# Patient Record
Sex: FEMALE | Race: WHITE | HISPANIC OR LATINO | Employment: FULL TIME | URBAN - METROPOLITAN AREA
[De-identification: names, ages, dates, MRNs, and addresses within clinical notes are randomized per-mention and may not be internally consistent; named-entity substitution may affect disease eponyms.]

---

## 2024-05-10 ENCOUNTER — HOSPITAL ENCOUNTER (OUTPATIENT)
Dept: LAB | Facility: MEDICAL CENTER | Age: 54
End: 2024-05-10
Attending: NURSE PRACTITIONER
Payer: COMMERCIAL

## 2024-05-10 DIAGNOSIS — E55.9 VITAMIN D DEFICIENCY: ICD-10-CM

## 2024-05-10 DIAGNOSIS — R74.8 ELEVATED ALKALINE PHOSPHATASE LEVEL: ICD-10-CM

## 2024-05-10 DIAGNOSIS — Z11.4 SCREENING FOR HIV (HUMAN IMMUNODEFICIENCY VIRUS): ICD-10-CM

## 2024-05-10 DIAGNOSIS — E78.2 MIXED HYPERLIPIDEMIA: ICD-10-CM

## 2024-05-10 DIAGNOSIS — R73.03 PREDIABETES: ICD-10-CM

## 2024-05-10 LAB
25(OH)D3 SERPL-MCNC: 37 NG/ML (ref 30–100)
ALBUMIN SERPL BCP-MCNC: 4.5 G/DL (ref 3.2–4.9)
ALBUMIN/GLOB SERPL: 1.6 G/DL
ALP SERPL-CCNC: 114 U/L (ref 30–99)
ALT SERPL-CCNC: 18 U/L (ref 2–50)
ANION GAP SERPL CALC-SCNC: 11 MMOL/L (ref 7–16)
AST SERPL-CCNC: 22 U/L (ref 12–45)
BASOPHILS # BLD AUTO: 0.8 % (ref 0–1.8)
BASOPHILS # BLD: 0.05 K/UL (ref 0–0.12)
BILIRUB SERPL-MCNC: 0.4 MG/DL (ref 0.1–1.5)
BUN SERPL-MCNC: 15 MG/DL (ref 8–22)
CALCIUM ALBUM COR SERPL-MCNC: 9.1 MG/DL (ref 8.5–10.5)
CALCIUM SERPL-MCNC: 9.5 MG/DL (ref 8.5–10.5)
CHLORIDE SERPL-SCNC: 107 MMOL/L (ref 96–112)
CHOLEST SERPL-MCNC: 162 MG/DL (ref 100–199)
CO2 SERPL-SCNC: 23 MMOL/L (ref 20–33)
CREAT SERPL-MCNC: 0.69 MG/DL (ref 0.5–1.4)
EOSINOPHIL # BLD AUTO: 0.18 K/UL (ref 0–0.51)
EOSINOPHIL NFR BLD: 2.7 % (ref 0–6.9)
ERYTHROCYTE [DISTWIDTH] IN BLOOD BY AUTOMATED COUNT: 51.3 FL (ref 35.9–50)
EST. AVERAGE GLUCOSE BLD GHB EST-MCNC: 126 MG/DL
FASTING STATUS PATIENT QL REPORTED: NORMAL
GFR SERPLBLD CREATININE-BSD FMLA CKD-EPI: 104 ML/MIN/1.73 M 2
GLOBULIN SER CALC-MCNC: 2.8 G/DL (ref 1.9–3.5)
GLUCOSE SERPL-MCNC: 94 MG/DL (ref 65–99)
HBA1C MFR BLD: 6 % (ref 4–5.6)
HCT VFR BLD AUTO: 42.6 % (ref 37–47)
HDLC SERPL-MCNC: 43 MG/DL
HGB BLD-MCNC: 14.1 G/DL (ref 12–16)
HIV 1+2 AB+HIV1 P24 AG SERPL QL IA: NORMAL
IMM GRANULOCYTES # BLD AUTO: 0.01 K/UL (ref 0–0.11)
IMM GRANULOCYTES NFR BLD AUTO: 0.2 % (ref 0–0.9)
LDLC SERPL CALC-MCNC: 101 MG/DL
LYMPHOCYTES # BLD AUTO: 2.29 K/UL (ref 1–4.8)
LYMPHOCYTES NFR BLD: 34.8 % (ref 22–41)
MCH RBC QN AUTO: 29.1 PG (ref 27–33)
MCHC RBC AUTO-ENTMCNC: 33.1 G/DL (ref 32.2–35.5)
MCV RBC AUTO: 88 FL (ref 81.4–97.8)
MONOCYTES # BLD AUTO: 0.46 K/UL (ref 0–0.85)
MONOCYTES NFR BLD AUTO: 7 % (ref 0–13.4)
NEUTROPHILS # BLD AUTO: 3.59 K/UL (ref 1.82–7.42)
NEUTROPHILS NFR BLD: 54.5 % (ref 44–72)
NRBC # BLD AUTO: 0 K/UL
NRBC BLD-RTO: 0 /100 WBC (ref 0–0.2)
PLATELET # BLD AUTO: 281 K/UL (ref 164–446)
PMV BLD AUTO: 11 FL (ref 9–12.9)
POTASSIUM SERPL-SCNC: 3.9 MMOL/L (ref 3.6–5.5)
PROT SERPL-MCNC: 7.3 G/DL (ref 6–8.2)
RBC # BLD AUTO: 4.84 M/UL (ref 4.2–5.4)
SODIUM SERPL-SCNC: 141 MMOL/L (ref 135–145)
TRIGL SERPL-MCNC: 91 MG/DL (ref 0–149)
WBC # BLD AUTO: 6.6 K/UL (ref 4.8–10.8)

## 2024-05-16 ENCOUNTER — OFFICE VISIT (OUTPATIENT)
Dept: MEDICAL GROUP | Facility: PHYSICIAN GROUP | Age: 54
End: 2024-05-16
Payer: COMMERCIAL

## 2024-05-16 ENCOUNTER — HOSPITAL ENCOUNTER (OUTPATIENT)
Facility: MEDICAL CENTER | Age: 54
End: 2024-05-16
Attending: NURSE PRACTITIONER
Payer: COMMERCIAL

## 2024-05-16 VITALS
DIASTOLIC BLOOD PRESSURE: 64 MMHG | BODY MASS INDEX: 30.49 KG/M2 | HEIGHT: 65 IN | HEART RATE: 71 BPM | WEIGHT: 183 LBS | TEMPERATURE: 96.8 F | SYSTOLIC BLOOD PRESSURE: 112 MMHG | OXYGEN SATURATION: 97 %

## 2024-05-16 DIAGNOSIS — N89.8 VAGINAL ITCHING: ICD-10-CM

## 2024-05-16 DIAGNOSIS — Z23 NEED FOR VACCINATION: ICD-10-CM

## 2024-05-16 DIAGNOSIS — E55.9 VITAMIN D DEFICIENCY: ICD-10-CM

## 2024-05-16 DIAGNOSIS — R74.8 ELEVATED ALKALINE PHOSPHATASE LEVEL: ICD-10-CM

## 2024-05-16 DIAGNOSIS — E66.9 OBESITY (BMI 30-39.9): ICD-10-CM

## 2024-05-16 DIAGNOSIS — R30.0 DYSURIA: ICD-10-CM

## 2024-05-16 DIAGNOSIS — Z12.31 ENCOUNTER FOR SCREENING MAMMOGRAM FOR MALIGNANT NEOPLASM OF BREAST: ICD-10-CM

## 2024-05-16 DIAGNOSIS — Z00.01 ANNUAL VISIT FOR GENERAL ADULT MEDICAL EXAMINATION WITH ABNORMAL FINDINGS: ICD-10-CM

## 2024-05-16 DIAGNOSIS — R73.03 PREDIABETES: ICD-10-CM

## 2024-05-16 DIAGNOSIS — E78.2 MIXED HYPERLIPIDEMIA: ICD-10-CM

## 2024-05-16 LAB
APPEARANCE UR: CLEAR
BILIRUB UR STRIP-MCNC: NEGATIVE MG/DL
COLOR UR AUTO: YELLOW
GLUCOSE UR STRIP.AUTO-MCNC: NEGATIVE MG/DL
KETONES UR STRIP.AUTO-MCNC: NEGATIVE MG/DL
LEUKOCYTE ESTERASE UR QL STRIP.AUTO: NEGATIVE
NITRITE UR QL STRIP.AUTO: NEGATIVE
PH UR STRIP.AUTO: 5.5 [PH] (ref 5–8)
PROT UR QL STRIP: NEGATIVE MG/DL
RBC UR QL AUTO: NORMAL
SP GR UR STRIP.AUTO: 1.01
UROBILINOGEN UR STRIP-MCNC: 0.2 MG/DL

## 2024-05-16 PROCEDURE — 3078F DIAST BP <80 MM HG: CPT | Performed by: NURSE PRACTITIONER

## 2024-05-16 PROCEDURE — 3074F SYST BP LT 130 MM HG: CPT | Performed by: NURSE PRACTITIONER

## 2024-05-16 PROCEDURE — 90715 TDAP VACCINE 7 YRS/> IM: CPT | Performed by: NURSE PRACTITIONER

## 2024-05-16 PROCEDURE — 81002 URINALYSIS NONAUTO W/O SCOPE: CPT | Performed by: NURSE PRACTITIONER

## 2024-05-16 PROCEDURE — 99396 PREV VISIT EST AGE 40-64: CPT | Mod: 25 | Performed by: NURSE PRACTITIONER

## 2024-05-16 PROCEDURE — 90471 IMMUNIZATION ADMIN: CPT | Performed by: NURSE PRACTITIONER

## 2024-05-16 PROCEDURE — 90746 HEPB VACCINE 3 DOSE ADULT IM: CPT | Performed by: NURSE PRACTITIONER

## 2024-05-16 PROCEDURE — 90472 IMMUNIZATION ADMIN EACH ADD: CPT | Performed by: NURSE PRACTITIONER

## 2024-05-16 RX ORDER — PRAVASTATIN SODIUM 40 MG
40 TABLET ORAL NIGHTLY
Qty: 90 TABLET | Refills: 3 | Status: SHIPPED | OUTPATIENT
Start: 2024-05-16

## 2024-05-16 ASSESSMENT — PATIENT HEALTH QUESTIONNAIRE - PHQ9
8. MOVING OR SPEAKING SO SLOWLY THAT OTHER PEOPLE COULD HAVE NOTICED. OR THE OPPOSITE, BEING SO FIGETY OR RESTLESS THAT YOU HAVE BEEN MOVING AROUND A LOT MORE THAN USUAL: NOT AT ALL
7. TROUBLE CONCENTRATING ON THINGS, SUCH AS READING THE NEWSPAPER OR WATCHING TELEVISION: NOT AT ALL
SUM OF ALL RESPONSES TO PHQ9 QUESTIONS 1 AND 2: 0
3. TROUBLE FALLING OR STAYING ASLEEP OR SLEEPING TOO MUCH: NOT AT ALL
5. POOR APPETITE OR OVEREATING: NOT AT ALL
1. LITTLE INTEREST OR PLEASURE IN DOING THINGS: NOT AT ALL
4. FEELING TIRED OR HAVING LITTLE ENERGY: NOT AT ALL
SUM OF ALL RESPONSES TO PHQ QUESTIONS 1-9: 0
6. FEELING BAD ABOUT YOURSELF - OR THAT YOU ARE A FAILURE OR HAVE LET YOURSELF OR YOUR FAMILY DOWN: NOT AL ALL
9. THOUGHTS THAT YOU WOULD BE BETTER OFF DEAD, OR OF HURTING YOURSELF: NOT AT ALL
2. FEELING DOWN, DEPRESSED, IRRITABLE, OR HOPELESS: NOT AT ALL

## 2024-05-16 ASSESSMENT — FIBROSIS 4 INDEX: FIB4 SCORE: 0.96

## 2024-05-16 NOTE — PROGRESS NOTES
Subjective:     CC:   Chief Complaint   Patient presents with    Annual Exam    Lab Results       HPI:   Chiquita Duran is a 54 y.o. female who presents for annual exam. She is feeling well and denies any complaints.\    Elevated alkaline phosphatase level  Levels improving. She has been losing weight with keto diet. Exercises daily. No right upper quadrant pain, nausea, vomiting or jaundice. Discussion today to check ultrasound vs monitoring and patient would like to monitor. Repeat labs 6 months.     Mixed hyperlipidemia  Recent labs show controlled cholesterol.  Continues pravastatin 40 mg nightly.    Obesity (BMI 30-39.9)  BMI today 30.12 kg/m².  She has started a keto diet and has been able to lose 25 pounds in total.  She does exercise daily.  Declines referral to dietitian today.    Prediabetes  A1c improved to 6.0%.  She continues to watch her diet and exercises.  She has a family history of diabetes.  Discussion to continue A1c every 6 months.    Vitamin D deficiency  Recent vitamin D level WNL.  Continue daily Centrum multivitamin.    Vaginal itching and dysuria   Vaginal itching for the last month. No vaginal discharge, fever, chills, urinary frequency. She does have dysuria.         Ob-Gyn/ History:    Patient has GYN provider: yes and Dr. Terrell Crespo, has appointment later this month.   /Para:  2/2  Last Pap Smear:  2022. Yes history of abnormal pap smears.  Gyn Surgery:  none.  Current Contraceptive Method:  condom. Yes currently sexually active.  Last menstrual period:  3/17/2024.  Periods irregular. light, heavy bleeding. Cramping is none.   She does not take OTC analgesics for cramps.  No significant bloating/fluid retention, pelvic pain, or dyspareunia. No vaginal discharge  Urinary incontinence: no      Health Maintenance  Cholesterol Screenin/10/2024   Diabetes Screenin/10/2024   Diet: She is following keto diet. She has been able to lose 25 pounds with  diet. No soda or energy drinks. Drinks mostly water. She watches her sugar intake.   Exercise: She is walking daily for 30-45 minutes.    Substance Abuse: Discussed and reviewed.    Safe in relationship.   Seat belts safety discussed.  Sun protection use encouraged.    Cancer screening  Colorectal Cancer Screening: 10/14/2022 with 10 year recall    Lung Cancer Screening: n/a    Cervical Cancer Screenin2022   Breast Cancer Screenin2023; continue annual screening     Infectious disease screening/Immunizations  --STI Screening: no   --Practices safe sex.  --HIV Screenin/10/2024   --Hepatitis C Screenin2023   --Immunizations:    Influenza: 2023    Tetanus: completed today    Shingles: completed series   Pneumococcal : n/a     Other immunizations: 3rd Hepatitis B dose today, encouraged COVID booster at the pharmacy     She  has a past medical history of Abdominal distension, gaseous, Hyperlipidemia, and Lactose intolerance.    She has no past medical history of Asthma or Hypertension.  She  has no past surgical history on file.    Family History   Problem Relation Age of Onset    Diabetes Mother     Diabetes Father     Diabetes Sister     Diabetes Brother     Diabetes Brother     Diabetes Sister     Diabetes Maternal Grandmother     No Known Problems Daughter     No Known Problems Daughter        Social History     Socioeconomic History    Marital status:      Spouse name: Not on file    Number of children: Not on file    Years of education: Not on file    Highest education level: Not on file   Occupational History    Not on file   Tobacco Use    Smoking status: Never    Smokeless tobacco: Never   Vaping Use    Vaping status: Never Used   Substance and Sexual Activity    Alcohol use: No     Comment: rare    Drug use: No    Sexual activity: Yes     Partners: Male     Birth control/protection: Condom   Other Topics Concern    Not on file   Social History Narrative    Not on file  "    Social Determinants of Health     Financial Resource Strain: Not on file   Food Insecurity: Not on file   Transportation Needs: Not on file   Physical Activity: Not on file   Stress: Not on file   Social Connections: Not on file   Intimate Partner Violence: Not on file   Housing Stability: Not on file       Patient Active Problem List    Diagnosis Date Noted    Elevated alkaline phosphatase level 06/27/2023    Obesity (BMI 30-39.9) 06/27/2022    Current mild episode of major depressive disorder without prior episode (HCC) 06/27/2022    Prediabetes 06/18/2021    Vitamin D deficiency 04/14/2014    Mixed hyperlipidemia 08/03/2010    Lactose intolerance 05/04/2010    Family history of diabetes mellitus 05/04/2010         Current Outpatient Medications   Medication Sig Dispense Refill    pravastatin (PRAVACHOL) 40 MG tablet Take 1 Tablet by mouth every evening. 90 Tablet 3    Cyanocobalamin (B-12 PO) Take  by mouth.      Multiple Vitamins-Minerals (CENTRUM) TABS Take 1 Tab by mouth every day. 100 Tab 3     No current facility-administered medications for this visit.     No Known Allergies    Review of Systems   Constitutional: Negative for fever, chills and malaise/fatigue.   HENT: Negative for congestion.    Eyes: Negative for pain.   Respiratory: Negative for cough and shortness of breath.    Cardiovascular: Negative for leg swelling.   Gastrointestinal: Negative for nausea, vomiting, abdominal pain and diarrhea.   Genitourinary: see HPI  Skin: Negative for rash.   Neurological: Negative for dizziness, focal weakness and headaches.   Endo/Heme/Allergies: Does not bruise/bleed easily.   Psychiatric/Behavioral: Negative for depression.  The patient is not nervous/anxious.      Objective:     Vital signs reviewed   /64 (BP Location: Left arm, Patient Position: Sitting, BP Cuff Size: Adult)   Pulse 71   Temp 36 °C (96.8 °F) (Temporal)   Ht 1.66 m (5' 5.35\")   Wt 83 kg (183 lb)   LMP 03/17/2024   SpO2 97%   " BMI 30.12 kg/m²   Body mass index is 30.12 kg/m².  Wt Readings from Last 4 Encounters:   05/16/24 83 kg (183 lb)   03/28/24 83.9 kg (185 lb)   11/07/23 85.3 kg (188 lb)   06/27/23 91.2 kg (201 lb)       Physical Exam:  Constitutional: Well-developed and well-nourished. Not diaphoretic. No distress.   Skin: Skin is warm and dry. No rash noted.  Head: Atraumatic without lesions.  Eyes: Conjunctivae and extraocular motions are normal. Pupils are equal, round, and reactive to light. No scleral icterus.   Ears:  External ears unremarkable. Tympanic membranes clear and intact.  Nose: Nares patent. Septum midline. Turbinates without erythema nor edema. No discharge.   Mouth/Throat: Dentition is intact. Tongue normal. Oropharynx is clear and moist. Posterior pharynx without erythema or exudates.  Neck: Supple, trachea midline. Normal range of motion. No lymphadenopathy--cervical or supraclavicular.  Cardiovascular: Regular rate and rhythm, S1 and S2 without murmur, rubs, or gallops.  Lungs: Normal inspiratory effort, CTA bilaterally, no wheezes/rhonchi/rales  Abdomen: Soft, non tender, and without distention. Active bowel sounds in all four quadrants. No rebound, guarding, masses or HSM. No CVA tenderness.  Extremities: No cyanosis, clubbing, erythema, nor edema. Distal pulses intact and symmetric.   Musculoskeletal: All major joints AROM full in all directions without pain.  Neurological: Alert and oriented x 3.  Psychiatric:  Behavior, mood, and affect are appropriate.    Lab:     Latest Reference Range & Units 05/16/24 12:05   POC Color Negative  yellow   POC Appearance Negative  clear   POC Specific Gravity <1.005 - >1.030  1.010   POC Urine PH 5.0 - 8.0  5.5   POC Glucose Negative mg/dL negative   POC Ketones Negative mg/dL negative   POC Protein Negative mg/dL negative   POC Nitrites Negative  negative   POC Leukocyte Esterase Negative  negative   POC Blood Negative  trace-lysed   POC Bilirubin Negative mg/dL negative    POC Urobiligen Negative (0.2) mg/dL 0.2       Assessment and Plan:     1. Annual visit for general adult medical examination with abnormal findings  Acute uncomplicated problem.  Annual exam completed today. Discussion today about general wellness and lifestyle habits:  Engage in regular physical and social activities  Skin care, including sunscreen  Recommended annual eye exams and annual dental exams  Discussed wearing seatbelt when in car at all times     2. Dysuria  Acute uncomplicated problem.  Ongoing problem for 1 month.  Urinalysis in office today negative for nitrates or leukocytes.  Trace blood, no CVA tenderness.  Discussion today to culture urine first before prescribing antibiotics.  - POCT Urinalysis  - URINE CULTURE(NEW); Future    3. Vaginal itching  Acute uncomplicated problem.  She self collected vaginal swab.  Await results.  - VAGINAL PATHOGENS DNA PANEL; Future    4. Prediabetes  Chronic stable problem.  Continue healthy diet and exercise.  Continue A1c every 6 months.  - HEMOGLOBIN A1C; Future    5. Elevated alkaline phosphatase level  Chronic stable problem.  She is asymptomatic.  Repeat labs in 6 months around November 2024.  - HEPATIC FUNCTION PANEL; Future    6. Mixed hyperlipidemia  Chronic stable problem.  Continue pravastatin 40 mg every evening.  - pravastatin (PRAVACHOL) 40 MG tablet; Take 1 Tablet by mouth every evening.  Dispense: 90 Tablet; Refill: 3    7. Obesity (BMI 30-39.9)  Chronic stable problem.  Continue healthy diet and exercise.  - Patient identified as having weight management issue.  Appropriate orders and counseling given.    8. Vitamin D deficiency  Chronic stable problem.  Continue daily Centrum multivitamin.    9. Encounter for screening mammogram for malignant neoplasm of breast  Acute uncomplicated problem.  Continue annual screening.  Due September 2024.  - MA-SCREENING MAMMO BILAT W/TOMOSYNTHESIS W/CAD; Future    10. Need for vaccination  Acute uncomplicated  problem.  She would like her vaccines today. I have placed the below orders and discussed them with an approved delegating provider. The MA is performing the below orders under the direction of Dr. Vargas.  - Hepatitis B Vaccine Adult 20+  - Tdap Vaccine =>6YO IM      HCM:  vaccines ordered   Labs per orders  Immunizations per orders  Patient counseled about skin care, diet, supplements, prenatal vitamins, safe sex and exercise.    Follow-up: Return in about 6 months (around 11/16/2024) for Labs, prediabetes .      Please note that this dictation was created using voice recognition software. I have made every reasonable attempt to correct obvious errors, but I expect that there are errors of grammar and possibly content that I did not discover before finalizing the note.

## 2024-05-16 NOTE — ASSESSMENT & PLAN NOTE
A1c improved to 6.0%.  She continues to watch her diet and exercises.  She has a family history of diabetes.  Discussion to continue A1c every 6 months.

## 2024-05-16 NOTE — ASSESSMENT & PLAN NOTE
BMI today 30.12 kg/m².  She has started a keto diet and has been able to lose 25 pounds in total.  She does exercise daily.  Declines referral to dietitian today.

## 2024-05-16 NOTE — ASSESSMENT & PLAN NOTE
Levels improving. She has been losing weight with keto diet. Exercises daily. No right upper quadrant pain, nausea, vomiting or jaundice. Discussion today to check ultrasound vs monitoring and patient would like to monitor. Repeat labs 6 months.

## 2024-05-17 LAB
CANDIDA DNA VAG QL PROBE+SIG AMP: NEGATIVE
G VAGINALIS DNA VAG QL PROBE+SIG AMP: NEGATIVE
T VAGINALIS DNA VAG QL PROBE+SIG AMP: NEGATIVE

## 2024-05-19 LAB
BACTERIA UR CULT: NORMAL
SIGNIFICANT IND 70042: NORMAL
SITE SITE: NORMAL
SOURCE SOURCE: NORMAL

## 2024-11-09 ENCOUNTER — HOSPITAL ENCOUNTER (OUTPATIENT)
Dept: LAB | Facility: MEDICAL CENTER | Age: 54
End: 2024-11-09
Attending: NURSE PRACTITIONER
Payer: COMMERCIAL

## 2024-11-09 DIAGNOSIS — R74.8 ELEVATED ALKALINE PHOSPHATASE LEVEL: ICD-10-CM

## 2024-11-09 DIAGNOSIS — R73.03 PREDIABETES: ICD-10-CM

## 2024-11-09 LAB
ALBUMIN SERPL BCP-MCNC: 4.1 G/DL (ref 3.2–4.9)
ALP SERPL-CCNC: 108 U/L (ref 30–99)
ALT SERPL-CCNC: 17 U/L (ref 2–50)
AST SERPL-CCNC: 21 U/L (ref 12–45)
BILIRUB CONJ SERPL-MCNC: <0.2 MG/DL (ref 0.1–0.5)
BILIRUB INDIRECT SERPL-MCNC: ABNORMAL MG/DL (ref 0–1)
BILIRUB SERPL-MCNC: 0.5 MG/DL (ref 0.1–1.5)
EST. AVERAGE GLUCOSE BLD GHB EST-MCNC: 126 MG/DL
HBA1C MFR BLD: 6 % (ref 4–5.6)
PROT SERPL-MCNC: 7.2 G/DL (ref 6–8.2)

## 2024-11-09 PROCEDURE — 83036 HEMOGLOBIN GLYCOSYLATED A1C: CPT

## 2024-11-09 PROCEDURE — 36415 COLL VENOUS BLD VENIPUNCTURE: CPT

## 2024-11-09 PROCEDURE — 80076 HEPATIC FUNCTION PANEL: CPT

## 2024-12-17 ENCOUNTER — OFFICE VISIT (OUTPATIENT)
Dept: MEDICAL GROUP | Facility: PHYSICIAN GROUP | Age: 54
End: 2024-12-17
Payer: COMMERCIAL

## 2024-12-17 VITALS
HEART RATE: 85 BPM | HEIGHT: 67 IN | WEIGHT: 183.86 LBS | SYSTOLIC BLOOD PRESSURE: 106 MMHG | TEMPERATURE: 98 F | BODY MASS INDEX: 28.86 KG/M2 | OXYGEN SATURATION: 94 % | DIASTOLIC BLOOD PRESSURE: 62 MMHG

## 2024-12-17 DIAGNOSIS — R74.8 ELEVATED ALKALINE PHOSPHATASE LEVEL: ICD-10-CM

## 2024-12-17 DIAGNOSIS — E78.2 MIXED HYPERLIPIDEMIA: ICD-10-CM

## 2024-12-17 DIAGNOSIS — Z71.2 ENCOUNTER TO DISCUSS TEST RESULTS: ICD-10-CM

## 2024-12-17 DIAGNOSIS — R73.03 PREDIABETES: ICD-10-CM

## 2024-12-17 DIAGNOSIS — E55.9 VITAMIN D DEFICIENCY: ICD-10-CM

## 2024-12-17 PROCEDURE — 99214 OFFICE O/P EST MOD 30 MIN: CPT | Performed by: NURSE PRACTITIONER

## 2024-12-17 PROCEDURE — 3074F SYST BP LT 130 MM HG: CPT | Performed by: NURSE PRACTITIONER

## 2024-12-17 PROCEDURE — 3078F DIAST BP <80 MM HG: CPT | Performed by: NURSE PRACTITIONER

## 2024-12-17 ASSESSMENT — FIBROSIS 4 INDEX: FIB4 SCORE: 0.94

## 2024-12-17 NOTE — PROGRESS NOTES
"Subjective:     CC: lab results     HPI:   Chiquita presents today with the following:    Prediabetes  Recent A1C stable at 6.0%.  She is walking 40 minutes day. She watches her sugar and carbohydrates.  Continue A1c every 6 months.    Elevated alkaline phosphatase level  Recent level improved to 108.  She is asymptomatic.  Continue healthy diet and exercise.  Plan for repeat labs in 6 months.      Past Medical History:   Diagnosis Date    Abdominal distension, gaseous     Hyperlipidemia     Lactose intolerance        Social History     Tobacco Use    Smoking status: Never    Smokeless tobacco: Never   Vaping Use    Vaping status: Never Used   Substance Use Topics    Alcohol use: No     Comment: rare    Drug use: No       Current Outpatient Medications Ordered in Epic   Medication Sig Dispense Refill    pravastatin (PRAVACHOL) 40 MG tablet Take 1 Tablet by mouth every evening. 90 Tablet 3    Cyanocobalamin (B-12 PO) Take  by mouth.      Multiple Vitamins-Minerals (CENTRUM) TABS Take 1 Tab by mouth every day. 100 Tab 3     No current Epic-ordered facility-administered medications on file.       Allergies:  Patient has no known allergies.    Health Maintenance: She completed her influenza vaccine at the pharmacy.  She will think about the COVID booster.      Objective:     Vital signs reviewed  Exam:  /62 (BP Location: Right arm, Patient Position: Sitting, BP Cuff Size: Adult)   Pulse 85   Temp 36.7 °C (98 °F) (Temporal)   Ht 1.697 m (5' 6.8\")   Wt 83.4 kg (183 lb 13.8 oz)   SpO2 94%   BMI 28.97 kg/m²  Body mass index is 28.97 kg/m².    Gen: Alert and oriented, No apparent distress.  Lungs: Normal effort, CTA bilaterally, no wheezes, rhonchi, or rales  CV: Regular rate and rhythm. No murmurs, rubs, or gallops.      Assessment & Plan:     54 y.o. female with the following -     1. Prediabetes  Chronic stable problem.  A1c stable at 6.0%.  Continue work on diet and exercise.  A1c in 6 months around May " 2025.  - HEMOGLOBIN A1C; Future    2. Elevated alkaline phosphatase level  Chronic exacerbated problem.  Continue monitoring and repeat in 6 months around May 2025.  - Comp Metabolic Panel; Future    3. Mixed hyperlipidemia  Chronic stable problem.  Continue pravastatin 40 mg nightly.  Continue work on healthy diet and exercise.  Check updated labs in 6 months around May 2025.  - CBC WITH DIFFERENTIAL; Future  - Comp Metabolic Panel; Future  - Lipid Profile; Future    4. Vitamin D deficiency  Chronic stable problem.  Continue daily multivitamin.  Due for annual labs around May 2025.  - VITAMIN D,25 HYDROXY (DEFICIENCY); Future    5. Encounter to discuss test results  Acute uncomplicated problem.  Discussed and reviewed lab results of 11/9/2024.      Return in about 5 months (around 5/17/2025) for annual, Labs.    Please note that this dictation was created using voice recognition software. I have made every reasonable attempt to correct obvious errors, but I expect that there are errors of grammar and possibly content that I did not discover before finalizing the note.

## 2024-12-17 NOTE — ASSESSMENT & PLAN NOTE
Recent A1C stable at 6.0%.  She is walking 40 minutes day. She watches her sugar and carbohydrates.  Continue A1c every 6 months.

## 2024-12-18 NOTE — ASSESSMENT & PLAN NOTE
Recent level improved to 108.  She is asymptomatic.  Continue healthy diet and exercise.  Plan for repeat labs in 6 months.

## 2025-03-24 ENCOUNTER — HOSPITAL ENCOUNTER (OUTPATIENT)
Facility: MEDICAL CENTER | Age: 55
End: 2025-03-24
Attending: FAMILY MEDICINE
Payer: COMMERCIAL

## 2025-03-24 ENCOUNTER — OFFICE VISIT (OUTPATIENT)
Dept: URGENT CARE | Facility: PHYSICIAN GROUP | Age: 55
End: 2025-03-24
Payer: COMMERCIAL

## 2025-03-24 VITALS
SYSTOLIC BLOOD PRESSURE: 120 MMHG | TEMPERATURE: 97.2 F | OXYGEN SATURATION: 95 % | BODY MASS INDEX: 30.82 KG/M2 | HEIGHT: 65 IN | DIASTOLIC BLOOD PRESSURE: 80 MMHG | HEART RATE: 75 BPM | WEIGHT: 185 LBS

## 2025-03-24 DIAGNOSIS — R39.9 UTI SYMPTOMS: ICD-10-CM

## 2025-03-24 LAB
APPEARANCE UR: CLEAR
BILIRUB UR STRIP-MCNC: NEGATIVE MG/DL
COLOR UR AUTO: YELLOW
GLUCOSE UR STRIP.AUTO-MCNC: NEGATIVE MG/DL
KETONES UR STRIP.AUTO-MCNC: NEGATIVE MG/DL
LEUKOCYTE ESTERASE UR QL STRIP.AUTO: NEGATIVE
NITRITE UR QL STRIP.AUTO: NEGATIVE
PH UR STRIP.AUTO: 5.5 [PH] (ref 5–8)
PROT UR QL STRIP: NEGATIVE MG/DL
RBC UR QL AUTO: NORMAL
SP GR UR STRIP.AUTO: 1.02
UROBILINOGEN UR STRIP-MCNC: NORMAL MG/DL

## 2025-03-24 PROCEDURE — 3074F SYST BP LT 130 MM HG: CPT | Performed by: FAMILY MEDICINE

## 2025-03-24 PROCEDURE — 3079F DIAST BP 80-89 MM HG: CPT | Performed by: FAMILY MEDICINE

## 2025-03-24 PROCEDURE — 87510 GARDNER VAG DNA DIR PROBE: CPT

## 2025-03-24 PROCEDURE — 81002 URINALYSIS NONAUTO W/O SCOPE: CPT | Performed by: FAMILY MEDICINE

## 2025-03-24 PROCEDURE — 87660 TRICHOMONAS VAGIN DIR PROBE: CPT

## 2025-03-24 PROCEDURE — 99213 OFFICE O/P EST LOW 20 MIN: CPT | Performed by: FAMILY MEDICINE

## 2025-03-24 PROCEDURE — 87480 CANDIDA DNA DIR PROBE: CPT

## 2025-03-24 ASSESSMENT — FIBROSIS 4 INDEX: FIB4 SCORE: 0.96

## 2025-03-25 DIAGNOSIS — R39.9 UTI SYMPTOMS: ICD-10-CM

## 2025-03-25 DIAGNOSIS — B96.89 BV (BACTERIAL VAGINOSIS): ICD-10-CM

## 2025-03-25 DIAGNOSIS — N76.0 BV (BACTERIAL VAGINOSIS): ICD-10-CM

## 2025-03-25 LAB
CANDIDA DNA VAG QL PROBE+SIG AMP: NEGATIVE
G VAGINALIS DNA VAG QL PROBE+SIG AMP: POSITIVE
T VAGINALIS DNA VAG QL PROBE+SIG AMP: NEGATIVE

## 2025-03-25 RX ORDER — METRONIDAZOLE 500 MG/1
500 TABLET ORAL 2 TIMES DAILY
Qty: 14 TABLET | Refills: 0 | Status: SHIPPED | OUTPATIENT
Start: 2025-03-25 | End: 2025-04-01

## 2025-03-25 NOTE — PROGRESS NOTES
"  Subjective:      55 y.o. female presents to urgent care for concerns of a bladder infection.  For the last week she has been experiencing increased urinary urgency, frequency, dysuria, and vaginal itching.  No associated hematuria.  Bowel movements are regular and soft.  She drinks an average of 2 L of water and 2 caffeinated beverages daily.  She is not currently sexually active \.    She denies any other questions or concerns at this time.    Current problem list, medication, and past medical/surgical history were reviewed in Epic.    ROS  See HPI     Objective:      /80 (BP Location: Right arm, Patient Position: Sitting, BP Cuff Size: Adult)   Pulse 75   Temp 36.2 °C (97.2 °F) (Temporal)   Ht 1.651 m (5' 5\")   Wt 83.9 kg (185 lb)   SpO2 95%   BMI 30.79 kg/m²     Physical Exam  Constitutional:       General: She is not in acute distress.     Appearance: She is not diaphoretic.   Cardiovascular:      Rate and Rhythm: Normal rate and regular rhythm.      Heart sounds: Normal heart sounds.   Pulmonary:      Effort: Pulmonary effort is normal. No respiratory distress.      Breath sounds: Normal breath sounds.   Abdominal:      General: Bowel sounds are normal.      Palpations: Abdomen is soft.      Tenderness: There is no abdominal tenderness. There is no right CVA tenderness or left CVA tenderness.   Neurological:      Mental Status: She is alert.   Psychiatric:         Mood and Affect: Affect normal.         Judgment: Judgment normal.       Assessment/Plan:     1. UTI symptoms  Urinalysis shows no sign of infection.  DNA Vag path has been sent.  Will treat appropriately once resulted  - POCT Urinalysis  - VAGINAL PATHOGENS DNA PANEL; Future      Instructed to return to Urgent Care or nearest Emergency Department if symptoms fail to improve, for any change in condition, further concerns, or new concerning symptoms. Patient states understanding of the plan of care and discharge instructions.    Karon HUFF" EJ Escobedo.

## 2025-06-04 DIAGNOSIS — E78.2 MIXED HYPERLIPIDEMIA: ICD-10-CM

## 2025-06-09 RX ORDER — PRAVASTATIN SODIUM 40 MG
40 TABLET ORAL EVERY EVENING
Qty: 90 TABLET | Refills: 0 | Status: SHIPPED | OUTPATIENT
Start: 2025-06-09

## 2025-06-10 NOTE — TELEPHONE ENCOUNTER
Requested Prescriptions     Signed Prescriptions Disp Refills    pravastatin (PRAVACHOL) 40 MG tablet 90 Tablet 0     Sig: TAKE ONE TABLET BY MOUTH EVERY EVENING     Authorizing Provider: AUBRIE CARODNA A.P.R.N.